# Patient Record
Sex: MALE | Race: WHITE
[De-identification: names, ages, dates, MRNs, and addresses within clinical notes are randomized per-mention and may not be internally consistent; named-entity substitution may affect disease eponyms.]

---

## 2017-08-08 ENCOUNTER — HOSPITAL ENCOUNTER (EMERGENCY)
Dept: HOSPITAL 47 - EC | Age: 62
Discharge: HOME | End: 2017-08-08
Payer: COMMERCIAL

## 2017-08-08 VITALS — SYSTOLIC BLOOD PRESSURE: 145 MMHG | DIASTOLIC BLOOD PRESSURE: 73 MMHG | RESPIRATION RATE: 18 BRPM | HEART RATE: 67 BPM

## 2017-08-08 VITALS — TEMPERATURE: 97.3 F
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
## 2017-08-08 DIAGNOSIS — R40.2412: ICD-10-CM

## 2017-08-08 DIAGNOSIS — Z87.891: ICD-10-CM
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
## 2017-08-08 DIAGNOSIS — S43.141A: Primary | ICD-10-CM

## 2017-08-08 PROCEDURE — 99283 EMERGENCY DEPT VISIT LOW MDM: CPT

## 2017-08-08 NOTE — XR
EXAMINATION TYPE: XR shoulder complete RT

 

DATE OF EXAM: 8/8/2017

 

CLINICAL HISTORY: Right shoulder pain after fall injury today.

 

TECHNIQUE:  Three views of the right shoulder are obtained.

 

COMPARISON: None. 

 

FINDINGS:  There is no acute fracture evident in the right shoulder. Ruptures are demineralized. Ther
e is joint space loss and spurring at glenohumeral joint. . Acromioclavicular joint separation with i
nferior margin of distal clavicle  superiorly up to 1.8 cm from inferior margin of acromion 
is present. The visualized ribs are intact and unremarkable.

 

IMPRESSION:  There is acromioclavicular joint separation injury as detailed above.
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
## 2019-06-10 ENCOUNTER — HOSPITAL ENCOUNTER (EMERGENCY)
Dept: HOSPITAL 47 - EC | Age: 64
Discharge: HOME | End: 2019-06-10
Payer: COMMERCIAL

## 2019-06-10 VITALS — HEART RATE: 7 BPM

## 2019-06-10 VITALS — RESPIRATION RATE: 20 BRPM | DIASTOLIC BLOOD PRESSURE: 77 MMHG | SYSTOLIC BLOOD PRESSURE: 121 MMHG | TEMPERATURE: 98.8 F

## 2019-06-10 DIAGNOSIS — Z87.891: ICD-10-CM

## 2019-06-10 DIAGNOSIS — J44.9: Primary | ICD-10-CM

## 2019-06-10 DIAGNOSIS — Z98.890: ICD-10-CM

## 2019-06-10 PROCEDURE — 71046 X-RAY EXAM CHEST 2 VIEWS: CPT

## 2019-06-10 PROCEDURE — 99285 EMERGENCY DEPT VISIT HI MDM: CPT

## 2019-06-10 PROCEDURE — 94640 AIRWAY INHALATION TREATMENT: CPT

## 2019-06-10 NOTE — XR
EXAMINATION TYPE: XR chest 2V

 

DATE OF EXAM: 6/10/2019

 

COMPARISON: NONE

 

TECHNIQUE: PA and lateral views submitted.

 

HISTORY: Pain

 

FINDINGS:

The lungs are clear and  there is no pneumothorax, pleural effusion, or focal pneumonia.  Heart is pr
ominent. Hypertrophic and degenerative changes spine. No overt failure. Right-sided pleural thickenin
g noted.

 

IMPRESSION: 

1. No acute process.

## 2019-06-10 NOTE — ED
SOB HPI





- General


Chief Complaint: Shortness of Breath


Stated Complaint: SOB


Time Seen by Provider: 06/10/19 15:03


Source: patient, RN notes reviewed


Mode of arrival: ambulatory


Limitations: no limitations





- History of Present Illness


Initial Comments: 





62-year-old male presents emergency Department with chief complaint of shortness

of breath.  Patient has underlying COPD and which she only uses albuterol 

inhaler.  Patient states that with his seasonal ALLERGIES been having increased 

shortness of breath.  Patient does get relief with his inhaler.  Patient is on 

no maintenance medications.  Patient denies fever, chills, productive cough.  

Patient denies any chest pain, headache or dizziness.





- Related Data


                                  Previous Rx's











 Medication  Instructions  Recorded


 


Ibuprofen [Motrin] 600 mg PO Q6HR PRN #20 tab 08/08/17


 


predniSONE 50 mg PO DAILY #5 tab 06/10/19











                                    Allergies











Allergy/AdvReac Type Severity Reaction Status Date / Time


 


No Known Allergies Allergy   Verified 08/08/17 12:13














Review of Systems


ROS Statement: 


Those systems with pertinent positive or pertinent negative responses have been 

documented in the HPI.





ROS Other: All systems not noted in ROS Statement are negative.





Past Medical History


Past Medical History: COPD, Hyperlipidemia, Hypertension


History of Any Multi-Drug Resistant Organisms: None Reported


Past Surgical History: Back Surgery, Orthopedic Surgery


Past Psychological History: No Psychological Hx Reported


Smoking Status: Former smoker


Past Alcohol Use History: None Reported


Past Drug Use History: None Reported





General Exam


Limitations: no limitations


General appearance: alert, in no apparent distress


Head exam: Present: atraumatic, normocephalic, normal inspection


Eye exam: Present: normal appearance, PERRL, EOMI.  Absent: scleral icterus, 

conjunctival injection, periorbital swelling


ENT exam: Present: normal exam, mucous membranes moist


Neck exam: Present: normal inspection.  Absent: tenderness, meningismus, 

lymphadenopathy


Respiratory exam: Present: wheezes (Minimal).  Absent: normal lung sounds 

bilaterally, respiratory distress, rales, rhonchi, stridor


Cardiovascular Exam: Present: regular rate, normal rhythm, normal heart sounds. 

 Absent: systolic murmur, diastolic murmur, rubs, gallop, clicks





Course





                                   Vital Signs











  06/10/19 06/10/19





  14:20 15:37


 


Temperature 98.8 F 


 


Pulse Rate 67 69


 


Respiratory 20 





Rate  


 


Blood Pressure 121/77 


 


O2 Sat by Pulse 93 L 





Oximetry  














Medical Decision Making





- Medical Decision Making





63-year-old male presenting emergency department because he uses his inhaler 

more than usual.  He has no current symptoms as the symptoms have resolved after

 use of his inhaler.  He did have some mild wheezing on exam was given 

treatment.  Patient was started on steroids for a mild COPD exacerbation related

 to seasonal ALLERGIES.  We discussed he states that follow-up with PCP or 

pulmonology to discuss preventative medications to slow down his use of abortive

 medications





Disposition


Clinical Impression: 


 COPD (chronic obstructive pulmonary disease)





Disposition: HOME SELF-CARE


Condition: Stable


Instructions (If sedation given, give patient instructions):  COPD (Chronic 

Obstructive Pulmonary Disease) (ED)


Additional Instructions: 


Please return to the Emergency Department if symptoms worsen or any other 

concerns.


Prescriptions: 


predniSONE 50 mg PO DAILY #5 tab


Is patient prescribed a controlled substance at d/c from ED?: No


Referrals: 


Adam Song MD [Primary Care Provider] - 1-2 days


Time of Disposition: 16:08

## 2020-12-22 ENCOUNTER — HOSPITAL ENCOUNTER (OUTPATIENT)
Dept: HOSPITAL 47 - SLEEP | Age: 65
Discharge: HOME | End: 2020-12-22
Attending: INTERNAL MEDICINE
Payer: MEDICARE

## 2020-12-22 DIAGNOSIS — J96.11: ICD-10-CM

## 2020-12-22 DIAGNOSIS — Z79.899: ICD-10-CM

## 2020-12-22 DIAGNOSIS — G47.00: Primary | ICD-10-CM

## 2020-12-22 DIAGNOSIS — E66.9: ICD-10-CM

## 2020-12-22 DIAGNOSIS — G89.29: ICD-10-CM

## 2020-12-22 DIAGNOSIS — F17.200: ICD-10-CM

## 2020-12-22 DIAGNOSIS — Z79.891: ICD-10-CM

## 2020-12-22 DIAGNOSIS — J44.9: ICD-10-CM

## 2020-12-22 DIAGNOSIS — M19.90: ICD-10-CM

## 2020-12-22 DIAGNOSIS — M54.9: ICD-10-CM

## 2020-12-22 DIAGNOSIS — E78.5: ICD-10-CM

## 2020-12-22 PROCEDURE — 99211 OFF/OP EST MAY X REQ PHY/QHP: CPT

## 2020-12-22 NOTE — CONS
CONSULTATION



REASON FOR CONSULTATION:

Sleep apnea.



This is a 65-year-old male patient with known history of COPD who follows up with Dr. John, maintained on Spiriva, smokes about one pack of cigarettes a day.  Primary care

physician was Dr. Song and currently Dr. Gaffney. The patient is having difficulties

in sleep maintenance.  He can easily go to sleep in his bedroom in his bed, and within

a few hours of falling asleep he will wake up choking and gasping and will move himself

to a recliner.  On the recliner his sleep quality is better and he can sleep another 5

hours on the recliner without any major difficulties.  In the morning he may take a nap

or two if needed.  He tries to sleep on his side.  On his back, he chokes and gasps and

has apneas.  He is  and he is currently living alone.  He is also retired.  He

has gained weight over the years. His weight gain is on the order of 30 pounds over the

past 5 years.  He has chronic exertional dyspnea.  He has resting dyspnea and wheezing,

and his room-air pulse ox is around 91%.  He can drive a car.  No history of any motor

vehicle accident because of feeling drowsy or sleepy.  No grinding of the teeth.  No

sleepwalking or sleeptalking.  No angina, palpitations or heartburn overnight.  No

significant shortness of breath overnight.  He is averaging around 5 to 7 hours of

sleep.



PAST MEDICAL HISTORY:

Obesity, hypothyroidism, COPD, hyperlipidemia, chronic back pain, osteoarthritis.



SURGICAL HISTORY:

Surgical history includes knee surgery and back surgery.



DRUG ALLERGIES:

NOT KNOWN.



OUTPATIENT MEDICATION LIST:

Outpatient medication list includes:

1. Lovastatin 5 mg p.o. daily.

2. Atenolol 50 mg p.o. daily.

3. Spiriva 1 inhalation a day.

4. Albuterol HFA on a p.r.n. basis.

5. Naprosyn on a p.r.n. basis.

6. Tylenol on a p.r.n. basis.

7. Melatonin on a p.r.n. basis.



SOCIAL HISTORY:

He smokes one pack of cigarettes a day.  No history of alcoholism.  No history of IV

drugs.  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



FAMILY HISTORY:

His brother has obstructive sleep apnea.



REVIEW OF SYSTEMS:

Fourteen-point review of system was done. Positive findings were all mentioned above in

the history of present illness.



PHYSICAL EXAMINATION:

VITAL SIGNS: BP is 158/72, pulse 67, respirations 20, temperature 96.7, saturation 91%

on room air.  Height is 5 feet 9 inches, weight is 272.  [place] score is 3.  Neck size

19 inches.  BMI is 40.

GENERAL APPEARANCE:  Calm, comfortable.

HEAD: Atraumatic, normocephalic.

NECK:  Supple.  No JVD.  No goiter or neck masses. Mallampati class IV along with an

overbite.

LUNGS:  Clear to auscultation.  Diminished. Scattered expiratory wheezes throughout the

lung fields bilaterally.

HEART:  Heart sounds are regular rate and rhythm.  Normal S1, S2.  No S3, S4.  No

murmurs.

ABDOMEN:  Soft, nontender.  No organomegaly.

EXTREMITIES:  No edema.  No cyanosis or clubbing.



IMPRESSION:

1. Sleep maintenance insomnia.  The patient is unable to sleep in his bedroom in his

    bed and he is moving up to a recliner.  No major hypersomnia or sleepiness.

    Nevertheless, his sleep is quite fragmented.  He snores and he has episodes of

    apnea, especially when he is lying down flat in his bed.  He has Mallampati class

    IV with an overbite.  Obviously he has features of obstructive sleep apnea that

    needs to be further investigated.

2. Chronic obstructive pulmonary disease.

3. Chronic hypoxemic respiratory failure.  Pulse ox on room air is 91%.

4. Smoker.

5. Obesity with a BMI of 40.1.

6. Chronic back pain.

7. Osteoarthritis.

8. Hyperlipidemia.



PLAN:

1. High suspicion for obstructive sleep apnea.

2. Ideally would like to do an in-lab PSG both in bed and on a recliner.  He declined

    that.  Will proceed with a home sleep study to assess the presence of sleep apnea

    and its severity and will offer patient treatment accordingly.  I do not think he

    is very much interested in CPAP therapy at this point; however, this may be an

    option, especially if he turns out to have severe disease.  We will continue to

    follow.



MMODL / IJN: [id number] / Job#: [id number]

## 2021-02-24 ENCOUNTER — HOSPITAL ENCOUNTER (OUTPATIENT)
Dept: HOSPITAL 47 - RADCTMAIN | Age: 66
Discharge: HOME | End: 2021-02-24
Attending: INTERNAL MEDICINE
Payer: MEDICARE

## 2021-02-24 DIAGNOSIS — F17.210: ICD-10-CM

## 2021-02-24 DIAGNOSIS — R91.1: ICD-10-CM

## 2021-02-24 DIAGNOSIS — Z12.2: Primary | ICD-10-CM

## 2021-02-24 PROCEDURE — 71271 CT THORAX LUNG CANCER SCR C-: CPT

## 2021-02-24 NOTE — CTL
EXAMINATION TYPE:  CT Low Dose Lung

 

DATE OF EXAM ORDERED: 2/24/2021

 

HISTORY: . Lung cancer screening

 

CT DLP: 110 mGycm

CT CTDI: 3.1 mGy

Automated exposure control for dose reduction was used.

 

SCREENING VISIT:

 

COMPARISON: None

 

TECHNIQUE: Low dose computed tomography scan was performed through the chest at 1 mm thick sections a
nd reconstructed images in the coronal plane at 1 mm thick sections.

 

CT DIAGNOSTIC QUALITY: Satisfactory

 

FINDINGS:

Exam limited by artifact. Axial image 87 there is a 2 mm nodule in the right upper lobe. Subsegmental
 changes at the left lung base most typical of atelectasis. No pleural effusion or pneumothorax. Hear
t size is within normal limits. Mild coronary artery calcification and atherosclerotic change aorta.

 

No pleural calcification or pneumothorax. No consolidative pneumonia. Hypertrophic changes of the spi
ne. Degenerative changes of the spine.

 

 IMPRESSION: 

1. Limited exam due to artifact demonstrates a single 2 mm nodule in the right upper lobe likely eliezer
gn.

 

CT LUNG RAD AND CT CHEST RECOMMENDATION: Lung-Rad 2 Benign Appearance or Behavior: Continue annual sc
reening with LDCT in 12 months.

 

S Modifier (other clinically significant findings): None

## 2021-04-26 ENCOUNTER — HOSPITAL ENCOUNTER (OUTPATIENT)
Dept: HOSPITAL 47 - RADNMMAIN | Age: 66
Discharge: HOME | End: 2021-04-26
Attending: UROLOGY
Payer: MEDICARE

## 2021-04-26 DIAGNOSIS — C61: ICD-10-CM

## 2021-04-26 DIAGNOSIS — M25.851: Primary | ICD-10-CM

## 2021-04-26 LAB — BUN SERPL-SCNC: 12 MG/DL (ref 9–20)

## 2021-04-26 PROCEDURE — 36415 COLL VENOUS BLD VENIPUNCTURE: CPT

## 2021-04-26 PROCEDURE — 71046 X-RAY EXAM CHEST 2 VIEWS: CPT

## 2021-04-26 PROCEDURE — 82565 ASSAY OF CREATININE: CPT

## 2021-04-26 PROCEDURE — 74177 CT ABD & PELVIS W/CONTRAST: CPT

## 2021-04-26 PROCEDURE — 84520 ASSAY OF UREA NITROGEN: CPT

## 2021-04-26 PROCEDURE — 78306 BONE IMAGING WHOLE BODY: CPT

## 2021-04-26 NOTE — XR
EXAMINATION TYPE: XR chest 2V

 

DATE OF EXAM: 4/26/2021

 

COMPARISON: Chest x-ray 6/10/2019

 

HISTORY: Prostate cancer

 

TECHNIQUE:  Frontal and lateral views of the chest are obtained.

 

FINDINGS:  There is no focal air space opacity, pleural effusion, or pneumothorax seen.  The cardiac 
silhouette size is within normal limits.   The osseous structures are intact, there is arthropathy in
 the left shoulder.

 

IMPRESSION:  No acute cardiopulmonary process.

## 2021-04-26 NOTE — NM
EXAMINATION TYPE: NM bone scan whole body

 

DATE OF EXAM: 4/26/2021

 

COMPARISON: CT chest 2/24/2021, CT abdomen pelvis 4/26/2021

 

HISTORY: Prostate carcinoma

 

Delayed whole-body scanning was performed following the injection of 23.4 mCi Tc 99m MDP.  Images acq
uired 3 hours post injection.

 

FINDINGS: 

 

The right seventh rib shows uptake which is consistent with patient's fracture, nonunion. Soft tissue
 uptake is normal. Uptake within the shoulders, spine, knees, feet, sternoclavicular joints is likely
 degenerative. No areas of abnormal increased or decreased uptake to suggest metastatic disease. 
shun focus on CT scan at the acetabular roof does not show increased uptake on bone scan.

 

IMPRESSION: 

 

No evident metastatic disease.

## 2021-04-27 ENCOUNTER — HOSPITAL ENCOUNTER (OUTPATIENT)
Dept: HOSPITAL 47 - SLEEP | Age: 66
End: 2021-04-27
Attending: INTERNAL MEDICINE
Payer: MEDICARE

## 2021-04-27 DIAGNOSIS — E66.9: ICD-10-CM

## 2021-04-27 DIAGNOSIS — E78.5: ICD-10-CM

## 2021-04-27 DIAGNOSIS — G47.33: Primary | ICD-10-CM

## 2021-04-27 DIAGNOSIS — E03.9: ICD-10-CM

## 2021-04-27 NOTE — PN
PROGRESS NOTE



This patient was diagnosed having obstructive sleep apnea.  The patient is coming here

for a compliancy check.  The patient was diagnosed having moderate to severe disease

with an AHI of 18 and the patient is currently on CPAP pressure of 10 cm of water.  The

patient is also being supplemented with oxygen to maintain saturation above 90%, as the

patient has demonstrated severe nocturnal oxygen desaturation.  The patient spent

approximately 66% of sleep time at a pulse ox of 85% and below and this was thought to

be related to an overlap between COPD and obstructive sleep apnea.  The patient is

doing well.  He is utilizing his machine every night.  He is using AirFit P10 nasal

pillow, large size.  Based on a 30-day compliancy check, the patient utilized his

machine every night and he has achieved more than 4 hours 80% of the time.  He is

averaging around 5.7 hours of CPAP use per night with a leak of 46 L/minutes.  His AHI

is down to 1.3.  More refreshed and alert during the day.  His sleep quality is

improved.  Nevertheless, he is hoping to get a different mask because of his ongoing

leaks through his nasal pillow.  No other significant issues otherwise.  No reported

chest pain, shortness of breath, heartburn.  No history of any motor vehicle accident

because of feeling drowsy or sleepy.  Comorbid conditions have been all stable.



PHYSICAL EXAMINATION:

VITAL SIGNS: BP is 135/82, pulse 68, respirations 16, temperature is 98.7,  saturation

97% on room air.  Weight is 258.  [place] score is at 5.

GENERAL APPEARANCE:  Obese, calm, comfortable.

HEAD: Atraumatic, normocephalic.

NECK:  Supple. There is no JVD.  No goiter or neck mass. Mallampati class 4.

LUNGS:  Clear to auscultation.

HEART:  Heart sounds are regular rate and rhythm. Normal S1, S2. No S3, no S4. No

murmurs.

ABDOMEN:  Soft, nontender. No organomegaly.

EXTREMITIES:  No edema. No cyanosis or clubbing.



IMPRESSION:

1. Obstructive sleep apnea, AHI of 18, currently on CPAP at a pressure of 10 cm of

    water.

2. Severe nocturnal oxygen desaturation and the patient is currently on CPAP pressure

    of 10 in conjunction with O2 at 2 L to maintain saturation above 90%.  Treatment

    has been successful for now.

3. Obesity.

4. Hypothyroidism.

5. Hyperlipidemia.

6. Chronic back pain/degenerative arthritis.

7. Chronic hypersomnia improved and the patient's [place] score is down to 5.



PLAN:

1. Clinically improved.

2. Sleep quality is improved.

3. Keep CPAP pressure at 10.

4. Drop the AirFit P10 mask and utilize a P30I nasal pillow as an alternative to

    minimize the leaks.

5. See me back in 6 months' time in followup.

Meanwhile, the patient is having some high pressure sensation on and off in the middle

of the night.  I suggested switching him to an APAP mode at a pressure minimum of 4

maximum of 10 and keeping the starting pressure at 4 with an automatic ramp time.  This

should hopefully make the patient much more comfortable and along with the change in

the mask, he should see much more improvement and further improvement in sleep quality.

We will continue to follow.





KATHIE / SHEILA: [id number] / Job#: [id number]

## 2021-08-10 NOTE — P.GSHP
History of Present Illness


H&P Date: 08/10/21





67 yo male with G8, t2a n0m0 cap, psa 6.7, 30ml,  undergoing ebrt/lhrh therapy 

comes for a Spaceoar injection prior to beginning the prostate radiation





- Constitutional


Constitutional: Denies chills, Denies fever





- EENT


Eyes: denies blurred vision, denies pain


Ears, nose, mouth and throat: Denies headache, Denies sore throat





- Cardiovascular


Cardiovascular: Denies chest pain, Denies shortness of breath





- Respiratory


Respiratory: Denies cough, Denies 7





- Gastrointestinal


Gastrointestinal: Denies abdominal pain, Denies diarrhea, Denies nausea, Denies 

vomiting





- Genitourinary (Female)


Genitourinary: Denies dysuria, Denies hematuria





- Genitourinary (Male)


Genitourinary: Denies dysuria, Denies hematuria





- Musculoskeletal


Musculoskeletal: Denies myalgias





- Integumentary


Integumentary: Denies pruritus, Denies rash





- Neurological


Neurological: Denies numbness, Denies weakness





- Psychiatric


Psychiatric: Denies anxiety, Denies depression





- Endocrine


Endocrine: Denies fatigue, Denies weight change





Past Medical History


Past Medical History: COPD, Hyperlipidemia, Hypertension


History of Any Multi-Drug Resistant Organisms: None Reported


Past Surgical History: Back Surgery, Orthopedic Surgery


Past Psychological History: No Psychological Hx Reported


Past Alcohol Use History: None Reported


Past Drug Use History: None Reported





Medications and Allergies


                                Home Medications











 Medication  Instructions  Recorded  Confirmed  Type


 


Ibuprofen [Motrin] 600 mg PO Q6HR PRN #20 tab 08/08/17  Rx


 


predniSONE 50 mg PO DAILY #5 tab 06/10/19  Rx








                                    Allergies











Allergy/AdvReac Type Severity Reaction Status Date / Time


 


No Known Allergies Allergy   Verified 08/08/17 12:13














Surgical - Exam





- General


well developed, well nourished, no distress





- Eyes


PERRL





- ENT


no hearing loss





- Neck


trachea midline





- Respiratory


normal expansion, normal respiratory effort





- Abdomen


Abdomen: soft, non tender





- Genitourinary


testicles present





- Integumentary


no rash, no growths





- Neurologic


normal coordination, normal sensation





- Musculoskeletal


normal gait, normal posture





- Psychiatric


oriented to time, oriented to person, oriented to place, speech is normal, 

memory intact





Assessment and Plan


Assessment: 





Impression:  Caprostate


Plan: spaceoar periprostatic injection

## 2021-08-12 ENCOUNTER — HOSPITAL ENCOUNTER (OUTPATIENT)
Dept: HOSPITAL 47 - OR | Age: 66
Discharge: HOME | End: 2021-08-12
Attending: UROLOGY
Payer: MEDICARE

## 2021-08-12 VITALS — TEMPERATURE: 97.7 F | RESPIRATION RATE: 16 BRPM

## 2021-08-12 VITALS — DIASTOLIC BLOOD PRESSURE: 58 MMHG | SYSTOLIC BLOOD PRESSURE: 104 MMHG | HEART RATE: 55 BPM

## 2021-08-12 DIAGNOSIS — I10: ICD-10-CM

## 2021-08-12 DIAGNOSIS — F17.200: ICD-10-CM

## 2021-08-12 DIAGNOSIS — E78.5: ICD-10-CM

## 2021-08-12 DIAGNOSIS — J44.9: ICD-10-CM

## 2021-08-12 DIAGNOSIS — Z85.46: ICD-10-CM

## 2021-08-12 DIAGNOSIS — C61: Primary | ICD-10-CM

## 2021-08-12 PROCEDURE — 55876 PLACE RT DEVICE/MARKER PROS: CPT

## 2021-08-12 NOTE — P.OP
Date of Procedure: 08/12/21


Preoperative Diagnosis: 


Adenocarcinoma of the Prostate


Postoperative Diagnosis: 


Same


Procedure(s) Performed: 


SpaceOAR Implant


Anesthesia: MAC


Surgeon: Mat Orozco


Estimated Blood Loss (ml): 10


IV fluids (ml): 350


Pathology: none sent


Condition: stable


Disposition: PACU


Indications for Procedure: 


67 yo male with Bea 8, [id number] prostate cancer.  His prostate volume was 30

mL, and his PSA level was 6.7.  He has elected to be treated with I am RT and 

androgen deprivation therapy.  He now comes for a SpaceOAR implant prior to 

beginning radiation therapy.


Operative Findings: 


11 mm separation created between prostate and rectum.


Description of Procedure: 


The patient was taken to the operating room and placed in the dorsolithotomy 

position, with his legs supported in Nixon stirrups.  The external genitalia was

prepped and draped sterilely.  The Bruel and Kjaer transrectal ultrasound probe 

was placed intrarectally.  The prostate was imaged.  The probe was then placed 

within the stabilizing stand.  A spinal needle was advanced under ultrasonic 

guidance to the level of the urogenital diaphragm, and lidocaine was used to 

infiltrate the tissues as the needle was withdrawn.  Next, the SpaceOAR needle 

was passed through the midline of the perineum, 1-2 cm anterior to the anal 

opening.  The needle was slowly advanced under ultrasonic guidance until the 

needle tip was located within the fat plane between the prostate and rectum, at 

the level of the mid prostate gland.  The needle was confirmed to be midline on 

the axial imaging.  A small amount of normal saline was injected for hyd

rodissection.  Next, the SpaceOAR components were mixed and loaded into the Y 

connector per protocol.  The Y connector was then connected to the needle, and 

the components were injected slowly over a course of approximately 12 seconds.  

A total of 10 ml was injected.  11 mm distance was created between the prostate 

and rectum, as desired.  It should be noted that at no point was there any 

concern of rectal perforation.  The needle was withdrawn, as well as the 

transrectal ultrasound probe, and the procedure was terminated.  The patient 

tolerated the procedure well and was taken to the recovery room in stable 

condition.

## 2021-11-16 ENCOUNTER — HOSPITAL ENCOUNTER (OUTPATIENT)
Dept: HOSPITAL 47 - SLEEP | Age: 66
End: 2021-11-16
Attending: INTERNAL MEDICINE
Payer: MEDICARE

## 2021-11-16 DIAGNOSIS — Z92.3: ICD-10-CM

## 2021-11-16 DIAGNOSIS — J44.9: ICD-10-CM

## 2021-11-16 DIAGNOSIS — Z87.891: ICD-10-CM

## 2021-11-16 DIAGNOSIS — Z79.899: ICD-10-CM

## 2021-11-16 DIAGNOSIS — Z85.46: ICD-10-CM

## 2021-11-16 DIAGNOSIS — M19.90: ICD-10-CM

## 2021-11-16 DIAGNOSIS — G47.33: Primary | ICD-10-CM

## 2021-11-16 DIAGNOSIS — Z99.89: ICD-10-CM

## 2021-11-16 DIAGNOSIS — E78.5: ICD-10-CM

## 2021-11-16 NOTE — PN
PROGRESS NOTE



66-year-old male patient coming in for an annual check regarding obstructive sleep

apnea.  The patient is a 66-year-old male patient known having obstructive sleep apnea

in addition to hypothyroidism and hyperlipidemia and degenerative arthritis.  The

patient states that he is a short sleeper and he is sleeping only 4 hours.  This is

reflected on the compliance data that was noted on his machine.  Note that the patient

has moderate severe REMEDIOS with an AHI of 18 and the patient is currently on an APAP at a

pressure minimum of 4, maximum of 10.  Based on a 30-day compliancy, the patient has

used the machine every day.  He has achieved more than 4 hours only 4 out of 30 days.

He is averaging around 2.9 hours of CPAP use per night.  Note that he goes to bed

around 10 p.m., wakes up 2:00 am in the morning and he moves to a recliner to watch TV

and he sleeps on and off without a CPAP until he gets up at around 5 o'clock in the

morning.  As such, he is not sleeping in bed all the time.  This is probably related to

his back pain.  His leak from the machine in the order of 20 L/minute and AHI is down

to 1.3 while on treatment.  He is using AirFit P10 large-sized nasal pillows.  His

weight is stable.  He has lost around 6 pounds since his last evaluation.  He has also

developed prostate cancer.  The patient was treated with radiation therapy.  He is

having some episodic nocturia.  No nighttime shortness of breath or chest pain or

shortness of breath.  No recent cardiovascular complications.



REVIEW OF SYSTEMS:

Fourteen-point review of system was done.  Positive findings are mentioned in history

of present illness.  He has nocturia and he has also prostate cancer treated by

radiation therapy through [facility] radiation department.  His COPD is stable on Spiriva.

His blood pressure is also stable.



PHYSICAL EXAMINATION:

BP is 128/62, pulse 64, respirations 20, temperature 97.7. Saturation is 92% on room

air.  Height is 5 feet 8 inches, weight is 252 and BMI 37.7.  Williamsburg score is 7.

General appearance:  Calm, comfortable.  Head is atraumatic, normocephalic.

Neck:  Supple.  No JVD.  No goiter.  No  neck mass. Mallampati class 4. Lungs:  Clear

to auscultation.  Heart:  Heart sounds are regular rate and rhythm. Normal S1, S2.  No

murmurs.  Abdomen:  Soft, nontender. No organomegaly.  Extremities:  No edema, no

cyanosis or clubbing.



IMPRESSION:

1. Symptomatic REMEDIOS, AHI of 18, currently on APAP, pressure minimum of 4, maximum of

    10.  While on treatment, the patient's treatment is successful.  Nevertheless, he

    seems to be a short sleeper and sleeping around 4 hours at night and he moved to a

    recliner and he watches television for the rest of the day.  He is not having any

    major tiredness or sleepiness.  Williamsburg score is at 7 and he is not taking any naps

    during the day.

2. History of prostate cancer with recent radiation therapy.

3. Chronic obstructive pulmonary diseased, currently inactive and stable on Spiriva.

4. Hyperlipidemia.

5. Degenerative arthritis.



PLAN:

I would recommend extending the number of hours of sleep in bed.  Compliance data would

look much better if the patient is able to sleep longer hours in bed.  Another option

is to move the CPAP machine with him to the living room where he can sleep on a

recliner.  He will be encouraged to lose weight.  He has lost around 6 pounds.  His

supplies are all up to date.  The patient is using AirFit P10 nasal pillows.  Continue

using the same nasal interface for now.  We will continue to follow.





MMODL / IJN: [id number] / Job#: [id number]

## 2022-06-01 ENCOUNTER — HOSPITAL ENCOUNTER (OUTPATIENT)
Dept: HOSPITAL 47 - RADCTMAIN | Age: 67
Discharge: HOME | End: 2022-06-01
Attending: INTERNAL MEDICINE
Payer: MEDICARE

## 2022-06-01 DIAGNOSIS — Z12.2: Primary | ICD-10-CM

## 2022-06-01 PROCEDURE — 71271 CT THORAX LUNG CANCER SCR C-: CPT

## 2022-06-01 NOTE — CTL
EXAMINATION TYPE:  CT Low Dose Lung

 

DATE OF EXAM ORDERED: 6/1/2022

 

HISTORY: . Lung cancer screening

 

CT DLP: 157.9 mGycm

CT CTDI: 4.1 mGy

Automated exposure control for dose reduction was used.

 

SCREENING VISIT: Subsequent

 

COMPARISON: 2/24/2021

 

TECHNIQUE: Low dose computed tomography scan was performed through the chest at 1 mm thick sections a
nd reconstructed images in the coronal plane at 1 mm thick sections.

 

CT DIAGNOSTIC QUALITY: Satisfactory

 

FINDINGS:

LUNG NODULES: None.  Previous nodule could not be reidentified.

 

LUNGS:

COPD: Severity: Mild correlate for chronic bronchitis.

Fibrosis: Severity: None 

Lymph nodes: None

Other findings: None

 

RIGHT PLEURAL SPACE:

Effusion: None

Calcification: None

Thickening: None

Pneumothorax: None

 

LEFT PLEURAL SPACE:

Effusion: None

Calcification: None

Thickening: None

Pneumothorax:

 

HEART:  

Heart Size: Normal

Coronary calcification: Mild

Pericardial effusion: None

 

OTHER FINDINGS:

Upper abdomen: Normal

Bony thorax: Normal

Supraclavicular region: Normal

Other: Ascending thoracic aorta at the level of main pulmonary artery is 4.0 cm. The pulmonary bifurc
ation is 3.1 cm.

 

IMPRESSION: Negative low-dose CT chest for neoplasm

 

FOLLOW UP CT CHEST RECOMMENDATION: 1 year

CT LUNG RAD: 1

## 2022-06-23 ENCOUNTER — HOSPITAL ENCOUNTER (OUTPATIENT)
Dept: HOSPITAL 47 - ORWHC2ENDO | Age: 67
Discharge: HOME | End: 2022-06-23
Attending: SURGERY
Payer: MEDICARE

## 2022-06-23 VITALS — HEART RATE: 56 BPM | DIASTOLIC BLOOD PRESSURE: 78 MMHG | SYSTOLIC BLOOD PRESSURE: 142 MMHG

## 2022-06-23 VITALS — RESPIRATION RATE: 18 BRPM | TEMPERATURE: 97.2 F

## 2022-06-23 VITALS — BODY MASS INDEX: 34.7 KG/M2

## 2022-06-23 DIAGNOSIS — C61: ICD-10-CM

## 2022-06-23 DIAGNOSIS — G47.33: ICD-10-CM

## 2022-06-23 DIAGNOSIS — J44.9: ICD-10-CM

## 2022-06-23 DIAGNOSIS — Z80.42: ICD-10-CM

## 2022-06-23 DIAGNOSIS — Z12.11: Primary | ICD-10-CM

## 2022-06-23 DIAGNOSIS — Z79.899: ICD-10-CM

## 2022-06-23 DIAGNOSIS — I10: ICD-10-CM

## 2022-06-23 DIAGNOSIS — Z87.891: ICD-10-CM

## 2022-06-23 DIAGNOSIS — D12.3: ICD-10-CM

## 2022-06-23 DIAGNOSIS — E78.5: ICD-10-CM

## 2022-06-23 DIAGNOSIS — M19.90: ICD-10-CM

## 2022-06-23 DIAGNOSIS — K64.8: ICD-10-CM

## 2022-06-23 DIAGNOSIS — K64.4: ICD-10-CM

## 2022-06-23 PROCEDURE — 45385 COLONOSCOPY W/LESION REMOVAL: CPT

## 2022-06-23 PROCEDURE — 88305 TISSUE EXAM BY PATHOLOGIST: CPT

## 2022-06-23 RX ADMIN — POTASSIUM CHLORIDE SCH MLS: 14.9 INJECTION, SOLUTION INTRAVENOUS at 08:06

## 2022-06-23 RX ADMIN — POTASSIUM CHLORIDE SCH MLS: 14.9 INJECTION, SOLUTION INTRAVENOUS at 08:33

## 2022-06-23 NOTE — P.GSHP
History of Present Illness


H&P Date: 06/23/22


Chief Complaint: Screening colonoscopy





This a 66-year-old male who presents today for screening colonoscopy.  Patient 

denies any significant GI complaints.





Past Medical History


Past Medical History: Cancer, COPD, Hyperlipidemia, Hypertension, Osteoarthritis

(OA), Sleep Apnea/CPAP/BIPAP


Additional Past Medical History / Comment(s): USES CPAP.  PROSTATE CA DIAGNOSED 

IN 4/8/21.  TB AS A CHILD.


History of Any Multi-Drug Resistant Organisms: None Reported


Past Surgical History: Back Surgery, Orthopedic Surgery, Tonsillectomy


Additional Past Surgical History / Comment(s): LEFT KNEE OPEN, COLONOSCOPY,


Past Anesthesia/Blood Transfusion Reactions: No Reported Reaction


Smoking Status: Current every day smoker





- Past Family History


  ** Mother


Family Medical History: Cancer





  ** Father


Family Medical History: Cancer


Additional Family Medical History / Comment(s): PROSTATE





Medications and Allergies


                                Home Medications











 Medication  Instructions  Recorded  Confirmed  Type


 


Albuterol Inhaler [Ventolin Hfa 1 puff INHALATION HS 08/10/21 06/23/22 History





Inhaler]    


 


Naproxen Sodium [Aleve] 220 - 440 mg PO Q6H PRN 08/10/21 06/23/22 History


 


Rosuvastatin Calcium 5 mg PO HS 08/10/21 06/23/22 History


 


Tiotropium 18 Mcg/Puff [Spiriva] 1 puff INHALATION QAM 08/10/21 06/23/22 History


 


atenoloL [Tenormin] 50 mg PO QAM 08/10/21 06/23/22 History








                                    Allergies











Allergy/AdvReac Type Severity Reaction Status Date / Time


 


No Known Allergies Allergy   Verified 06/23/22 08:04














Surgical - Exam


                                   Vital Signs











Temp Pulse Resp BP Pulse Ox


 


 97.2 F L  69   18   148/81   91 L


 


 06/23/22 08:07  06/23/22 08:07  06/23/22 08:07  06/23/22 08:07  06/23/22 08:07














- General


well developed, well nourished, no distress





- Eyes


PERRL





- ENT


normal pinna





- Neck


no masses





- Respiratory


normal expansion





- Cardiovascular


Rhythm: regular





- Abdomen


Abdomen: soft, non tender





Assessment and Plan


Assessment: 





We'll perform screening colonoscopy

## 2022-06-23 NOTE — P.OP
Date of Procedure: 06/23/22


Preoperative Diagnosis: 


Screening colonoscopy


Postoperative Diagnosis: 


Transverse colon polyp


Procedure(s) Performed: 


Colonoscopy


Anesthesia: MAC


Surgeon: Carlitos Cabrera


Pathology: other (Transverse colon polyp)


Condition: stable


Disposition: PACU


Description of Procedure: 


The patient's placed on the endoscopy table in the lateral position.  He 

received IV sedation.  Digital rectal exam performed which revealed a few 

external hemorrhoids.  Colonoscope was then placed patient anus and passed 

throughout the entire colon.  The ileocecal valve was visualized.  The right 

colon appeared normal.  In the transverse colon there was a peduncular polyp 

removed with snare.  Scope withdrawn and the remainder the transverse colon 

appeared normal.  In the descending and sigmoid colon was mild diverticular 

changes.  The scope was then brought back the rectum and this appeared normal.  

Scope withdrawn for patient.  Internal and external hemorrhoids are noted.

## 2022-08-26 ENCOUNTER — HOSPITAL ENCOUNTER (OUTPATIENT)
Dept: HOSPITAL 47 - RADECHMAIN | Age: 67
Discharge: HOME | End: 2022-08-26
Attending: INTERNAL MEDICINE
Payer: MEDICARE

## 2022-08-26 DIAGNOSIS — I27.20: ICD-10-CM

## 2022-08-26 DIAGNOSIS — I07.1: Primary | ICD-10-CM

## 2022-08-26 PROCEDURE — 93306 TTE W/DOPPLER COMPLETE: CPT

## 2022-08-26 NOTE — CA
Transthoracic Echo Report 

 Name: Layton Marks 

 MRN:    [id number] 

 Age:    67     Gender:     M 

 

 :    1955 

 Exam Date:     2022 08:14 

 Exam Location: [facility] 

 Ht (in):     71     Wt (lb):     245 

 Ordering Physician:        Irving Gaffney MD 

 Attending/Referring Phys: 

 Technician         Christal Orellana RDCS 

 Procedure CPT: 

 Indications:       palpitations R00.2 

 

 Cardiac Hx: 

 Technical Quality: 

 Contrast 1:                                Total Dose (mL): 

 Contrast 2:                                Total Dose (mL): 

 

 MEASUREMENTS  (Male / Female) Normal Values 

 2D ECHO 

 LV Diastolic Diameter PLAX        5.4 cm                4.2 - 5.9 / 3.9 - 5.3 cm 

 LV Systolic Diameter PLAX         4.0 cm                 

 IVS Diastolic Thickness           1.4 cm                0.6 - 1.0 / 0.6 - 0.9 cm 

 LVPW Diastolic Thickness          1.4 cm                0.6 - 1.0 / 0.6 - 0.9 cm 

 LV Relative Wall Thickness        0.5                    

 RV Internal Dim ED PLAX           3.8 cm                 

 LA Systolic Diameter LX           3.8 cm                3.0 - 4.0 / 2.7 - 3.8 cm 

 LA Volume                         81.3 cm???              18 - 58 / 22 - 52 cm??? 

 

 M-MODE 

 Aortic Root Diameter MM           4.1 cm                 

 MV E Point Septal Separation      1.1 cm                 

 AV Cusp Separation MM             2.4 cm                 

 

 DOPPLER 

 AV Peak Velocity                  152.1 cm/s             

 AV Peak Gradient                  9.3 mmHg               

 MV Area PHT                       2.3 cm???                

 Mitral E Point Velocity           80.0 cm/s              

 Mitral A Point Velocity           84.9 cm/s              

 Mitral E to A Ratio               0.9                    

 MV Deceleration Time              324.7 ms               

 TR Peak Velocity                  314.1 cm/s             

 TR Peak Gradient                  39.5 mmHg              

 Right Ventricular Systolic Press  44.5 mmHg              

 

 

 FINDINGS 

 Left Ventricle 

 Left ventricular ejection fraction is estimated at55-60 %. Left ventricular  

 cavity size normal. Moderate concentric left ventricular hypertrophy. 

 

 Right Ventricle 

 Moderate right ventricular dilatation. Mild pulmonary hypertension. 

 

 Right Atrium 

 Normal right atrial size. 

 

 Left Atrium 

 Severely increased left atrial volume. Mildly increased left atrial area. No  

 evidence for an atrial septal defect. 

 

 Mitral Valve 

 Structurally normal mitral valve. No evidence for mitral valve prolapse. Mild  

 mitral regurgitation. 

 

 Aortic Valve 

 Trileaflet aortic valve. No aortic valve stenosis or regurgitation. 

 

 Tricuspid Valve 

 Mild tricuspid regurgitation.structurally normal tricuspid valve. 

 

 Pulmonic Valve 

 Pulmonic valve not well visualized. 

 

 Pericardium 

 Normal pericardium. No pericardial effusion. 

 

 Aorta 

 Mild aortic dilatation at the level of the sinuses of valsalva 41 mm 

 

 CONCLUSIONS 

 1.  Normal left ventricular size and systolic function 

 2.  Mild mitral and tricuspid regurgitation with mild pulmonary hypertension 

 3.  Mildly dilated ascending aorta 

 Previewed by:  

 Dr. Irving Barrow MD 

 (Electronically Signed) 

 Final Date:      2022 18:22

## 2023-06-23 ENCOUNTER — HOSPITAL ENCOUNTER (OUTPATIENT)
Dept: HOSPITAL 47 - RADCTMAIN | Age: 68
Discharge: HOME | End: 2023-06-23
Attending: STUDENT IN AN ORGANIZED HEALTH CARE EDUCATION/TRAINING PROGRAM
Payer: MEDICARE

## 2023-06-23 ENCOUNTER — HOSPITAL ENCOUNTER (OUTPATIENT)
Dept: HOSPITAL 47 - RADUSWWP | Age: 68
Discharge: HOME | End: 2023-06-23
Attending: STUDENT IN AN ORGANIZED HEALTH CARE EDUCATION/TRAINING PROGRAM
Payer: MEDICARE

## 2023-06-23 DIAGNOSIS — Z12.2: Primary | ICD-10-CM

## 2023-06-23 DIAGNOSIS — Z87.891: ICD-10-CM

## 2023-06-23 DIAGNOSIS — I25.10: ICD-10-CM

## 2023-06-23 DIAGNOSIS — R91.1: ICD-10-CM

## 2023-06-23 DIAGNOSIS — Z13.6: Primary | ICD-10-CM

## 2023-06-23 PROCEDURE — 71271 CT THORAX LUNG CANCER SCR C-: CPT

## 2023-06-23 PROCEDURE — 76706 US ABDL AORTA SCREEN AAA: CPT

## 2023-06-23 NOTE — CTL
EXAMINATION TYPE:  CT Low Dose Lung

 

DATE OF EXAM ORDERED: 6/23/2023

 

HISTORY: . Lung cancer screening

 

CT DLP: 95 mGycm

CT CTDI: 2.6 mGy

Automated exposure control for dose reduction was used.

 

SCREENING VISIT:

 

COMPARISON: 6/1/2022

 

TECHNIQUE: Low dose computed tomography scan was performed through the chest at 1 mm thick sections a
nd reconstructed images in multiple planes at 1 mm and 5 mm thick sections.

 

CT DIAGNOSTIC QUALITY: Satisfactory

 

FINDINGS:

There is a 1 mm nodule posterior segment right lower lobe. Axial image 208. There is no consolidative
 pneumonia. No pleural effusion or pneumothorax.

 

The heart is mildly enlarged and there is coronary artery calcification. Aorta normal caliber with mi
ld atherosclerotic changes. There is a small hiatal hernia.

 

There is multilevel hypertrophic and degenerative changes of the spine.

 

IMPRESSION: 

1. There is a 1 mm subpleural nodule right lower lobe which has a benign appearance.

2. Mild coronary artery calcification.

 

CT LUNG RAD AND CT CHEST RECOMMENDATION: Lung-Rad 2 Benign Appearance or Behavior: Continue annual sc
reening with LDCT in 12 months.

## 2023-06-23 NOTE — US
EXAMINATION TYPE: US Aorta Screening

 

DATE OF EXAM: 6/23/2023

 

COMPARISON:

 

CLINICAL INDICATION: Male, 67 years old with history of Z13.6; No hx AAA.  HTN controlled with meds. 
 

 

TECHNIQUE: Multiple sonographic images of the abdominal aorta are obtained.

 

FINDINGS:

 

EXAM MEASUREMENTS:

 

Abdominal Aorta:

** Proximal:  2.2 x 2.6 cm

** Mid:  2.1 x 2.1 cm 

** Distal:  2.0 x 2.3 cm 

** Bifurcation:  Right illiac- 1.1 x 1.2 cm   Left illiac- 0.9 x 1.2 cm 

 

SONOGRAPHER NOTES: No AAA visualized at time of scan

 

 

 

IMPRESSION: 

No AAA

## 2023-07-26 ENCOUNTER — HOSPITAL ENCOUNTER (EMERGENCY)
Dept: HOSPITAL 47 - EC | Age: 68
Discharge: HOME | End: 2023-07-26
Payer: MEDICARE

## 2023-07-26 VITALS — TEMPERATURE: 98.1 F | SYSTOLIC BLOOD PRESSURE: 121 MMHG | HEART RATE: 51 BPM | DIASTOLIC BLOOD PRESSURE: 67 MMHG

## 2023-07-26 VITALS — RESPIRATION RATE: 20 BRPM

## 2023-07-26 DIAGNOSIS — Z79.899: ICD-10-CM

## 2023-07-26 DIAGNOSIS — F17.200: ICD-10-CM

## 2023-07-26 DIAGNOSIS — J44.9: ICD-10-CM

## 2023-07-26 DIAGNOSIS — M25.461: Primary | ICD-10-CM

## 2023-07-26 DIAGNOSIS — E78.5: ICD-10-CM

## 2023-07-26 DIAGNOSIS — I10: ICD-10-CM

## 2023-07-26 PROCEDURE — 99283 EMERGENCY DEPT VISIT LOW MDM: CPT

## 2023-07-26 PROCEDURE — 73562 X-RAY EXAM OF KNEE 3: CPT

## 2023-07-26 NOTE — XR
EXAMINATION TYPE: XR knee complete RT

 

DATE OF EXAM: 7/26/2023

 

COMPARISON: NONE

 

HISTORY: Pain

 

TECHNIQUE:  Frontal, lateral and oblique images of the right knee are obtained.

 

FINDINGS:  There is no acute fracture/dislocation evident. Mild tricompartmental joint space narrowin
g with marginal spurring. No osseous erosions. Moderate size suprapatellar joint effusion. The overly
ing soft tissue appears unremarkable. Vascular sclerosis.

 

IMPRESSION:  

1.  No acute fracture or dislocation seen.

2.  Moderate size suprapatellar joint effusion.

3.  Mild osteoarthritic changes.

## 2023-07-26 NOTE — ED
General Adult HPI





- General


Chief complaint: Extremity Problem,Nontraumatic


Stated complaint: R knee pain


Time Seen by Provider: 07/26/23 12:15


Source: patient, RN notes reviewed, old records reviewed


Mode of arrival: ambulatory


Limitations: no limitations





- History of Present Illness


Initial comments: 





This is a 68-year-old male who presents emergency Department complaining of 

right knee pain for the last 2 weeks.  Patient states there's been some soup 

breath patella swelling he states his been getting a little bit worse.  Patient 

states when he laying flat or not moving it doesn't hurt but when he gets up to 

walk he does feel some pain in the knee.  Patient states he doesn't recall any 

injury but he sure he probably twisted at some point in time.  Patient denies 

any hip pain patient with any ankle pain patient denies any other problems at 

this time





- Related Data


                                Home Medications











 Medication  Instructions  Recorded  Confirmed


 


Albuterol Inhaler [Ventolin Hfa 1 puff INHALATION HS 08/10/21 06/23/22





Inhaler]   


 


Naproxen Sodium [Aleve] 220 - 440 mg PO Q6H PRN 08/10/21 06/23/22


 


Rosuvastatin Calcium 5 mg PO HS 08/10/21 06/23/22


 


Tiotropium 18 Mcg/Puff [Spiriva] 1 puff INHALATION QAM 08/10/21 06/23/22


 


atenoloL [Tenormin] 50 mg PO QAM 08/10/21 06/23/22











                                    Allergies











Allergy/AdvReac Type Severity Reaction Status Date / Time


 


No Known Allergies Allergy   Verified 07/26/23 11:36














Review of Systems


ROS Statement: 


Those systems with pertinent positive or pertinent negative responses have been 

documented in the HPI.





ROS Other: All systems not noted in ROS Statement are negative.





Past Medical History


Past Medical History: Cancer, COPD, Hyperlipidemia, Hypertension, Osteoarthritis

 (OA), Sleep Apnea/CPAP/BIPAP


Additional Past Medical History / Comment(s): USES CPAP.  PROSTATE CA DIAGNOSED 

IN 4/8/21.  TB AS A CHILD.


History of Any Multi-Drug Resistant Organisms: None Reported


Past Surgical History: Back Surgery, Orthopedic Surgery


Additional Past Surgical History / Comment(s): LEFT KNEE OPEN


Past Anesthesia/Blood Transfusion Reactions: No Reported Reaction


Past Psychological History: No Psychological Hx Reported


Smoking Status: Current every day smoker


Past Alcohol Use History: None Reported


Past Drug Use History: None Reported





- Past Family History


  ** Mother


Family Medical History: Cancer





  ** Father


Family Medical History: Cancer


Additional Family Medical History / Comment(s): PROSTATE





General Exam





- General Exam Comments


Initial Comments: 





GENERAL 


Patient is well-developed and well-nourished.  Patient is in mild distress.





EYES


Patient's pupils are equal and round.  Extraocular motion is intact





SKIN


Unremarkable





NEURO


The patient is alert and oriented 3





PYSCH


Patient has normal interpersonal interactions.





MUSCULOSKELETAL


Patient has supra patella effusion.  Patient has no ligamentous laxity.  Patient

 is able to full range of motion.  There is no redness and there is no area of 

warmth


Limitations: no limitations





Course


                                   Vital Signs











  07/26/23





  11:32


 


Temperature 98.2 F


 


Pulse Rate 60


 


Respiratory 20





Rate 


 


Blood Pressure 125/77


 


O2 Sat by Pulse 99





Oximetry 














Medical Decision Making





- Medical Decision Making





Was pt. sent in by a medical professional or institution (Dr. PA, NP, urgent 

care, hospital, or nursing home...) When possible be specific


@  -No


Did you speak to anyone other than the patient for history (EMS, parent, family,

 police, friend...)? What history was obtained from this source 


@  -No


Did you review nursing and triage notes (agree or disagree)?  Why? 


@  -I reviewed and agree with nursing and triage notes


Were old charts reviewed (outside hosp., previous admission, EMS record, old 

EKG, old radiological studies, urgent care reports/EKG's, nursing home records)?

 Report findings 


@  -No old charts were reviewed


Differential Diagnosis (chest pain, altered mental status, abdominal pain women,

 abdominal pain men, vaginal bleeding, weakness, fever, dyspnea, syncope, 

headache, dizziness, GI bleed, back pain, seizure, CVA, palpatations, mental 

health, musculoskeletal)? 


@  -Differential Musculoskeletal


Muscular strain, contusion, ligament sprain, fracture, arthritis, septic 

arthritis, bursitis, cellulitis, muscle spasm, nerve compression, DVT, arterial 

occlusion, herpes zoster, electrolyte abnormality, tumor.... This is not meant 

to be in all inclusive list


EKG interpreted by me (3pts min.).


@  -As above


X-rays interpreted by me (1pt min.).


@  -X-ray of the knee shows a effusion that is moderate.  There is no fracture 

or acute injury noted


CT interpreted by me (1pt min.).


@  -None done


U/S interpreted by me (1pt. min.).


@  -None done


What testing was considered but not performed or refused? (CT, X-rays, U/S, labs

)? Why?


@  -None


What meds were considered but not given or refused? Why?


@  -None


Did you discuss the management of the patient with other professionals 

(professionals i.e. Dr., PA, NP, lab, RT, psych nurse, , , 

teacher, , )? Give summary


@  -No


Was smoking cessation discussed for >3mins.?


@  -No


Was critical care preformed (if so, how long)?


@  -No


Were there social determinants of health that impacted care today? How? 

(Homelessness, low income, unemployed, alcoholism, drug addiction, 

transportation, low edu. Level, literacy, decrease access to med. care, FDC, 

rehab)?


@  -No


Was there de-escalation of care discussed even if they declined (Discuss DNR or 

withdrawal of care, Hospice)? DNR status


@  -No


What co-morbidities impacted this encounter? (DM, HTN, Smoking, COPD, CAD, 

Cancer, CVA, ARF, Chemo, Hep., AIDS, mental health diagnosis, sleep apnea, 

morbid obesity)?


@  -None


Was patient admitted / discharged? Hospital course, mention meds given and 

route, prescriptions, significant lab abnormalities, going to OR and other 

pertinent info.


@  -And had an x-ray that did not show any acute injury did show an effusion.  

Patient had any immobilizer placed for comfort.  Patient will follow-up with 

orthopedics.


Undiagnosed new problem with uncertain prognosis?


@  -No


Drug Therapy requiring intensive monitoring for toxicity (Heparin, Nitro, 

Insulin, Cardizem)?


@  -No


Were any procedures done?


@  -No


Diagnosis/symptom?


@  -Knee effusion


Acute, or Chronic, or Acute on Chronic?


@  -Acute


Uncomplicated (without systemic symptoms) or Complicated (systemic symptoms)?


@  -Uncomplicated


Side effects of treatment?


@  -No


Exacerbation, Progression, or Severe Exacerbation?


@  -No


Poses a threat to life or bodily function? How? (Chest pain, USA, MI, pneumonia,

 PE, COPD, DKA, ARF, appy, cholecystitis, CVA, Diverticulitis, Homicidal, 

Suicidal, threat to staff... and all critical care pts)


@  -No





Disposition


Clinical Impression: 


 Knee effusion





Disposition: HOME SELF-CARE


Condition: Good


Is patient prescribed a controlled substance at d/c from ED?: No


Referrals: 


Leonidas Aguilar DO [Doctor of Osteopathic Medicine] - 1-2 days


Time of Disposition: 13:47

## 2023-09-14 ENCOUNTER — HOSPITAL ENCOUNTER (OUTPATIENT)
Dept: HOSPITAL 47 - LABWHC1 | Age: 68
Discharge: HOME | End: 2023-09-14
Attending: UROLOGY
Payer: MEDICARE

## 2023-09-14 DIAGNOSIS — C61: Primary | ICD-10-CM

## 2023-09-14 DIAGNOSIS — E78.2: ICD-10-CM

## 2023-09-14 LAB
ALT SERPL-CCNC: 19 U/L (ref 10–49)
AST SERPL-CCNC: 17 U/L (ref 14–35)
CHOLEST SERPL-MCNC: 182 MG/DL (ref 0–200)
HDLC SERPL-MCNC: 56.1 MG/DL (ref 40–60)
LDLC SERPL CALC-MCNC: 111.7 MG/DL (ref 0–131)
PSA SERPL-MCNC: 0.2 NG/ML (ref 0–4.5)
TRIGL SERPL-MCNC: 70.8 MG/DL (ref 0–149)
VLDLC SERPL CALC-MCNC: 14.16 MG/DL (ref 5–40)

## 2023-09-14 PROCEDURE — 84450 TRANSFERASE (AST) (SGOT): CPT

## 2023-09-14 PROCEDURE — 84460 ALANINE AMINO (ALT) (SGPT): CPT

## 2023-09-14 PROCEDURE — 80061 LIPID PANEL: CPT

## 2023-09-14 PROCEDURE — 84153 ASSAY OF PSA TOTAL: CPT

## 2023-09-14 PROCEDURE — 36415 COLL VENOUS BLD VENIPUNCTURE: CPT

## 2024-03-14 ENCOUNTER — HOSPITAL ENCOUNTER (OUTPATIENT)
Dept: HOSPITAL 47 - LABWHC1 | Age: 69
Discharge: HOME | End: 2024-03-14
Attending: UROLOGY
Payer: MEDICARE

## 2024-03-14 DIAGNOSIS — C61: Primary | ICD-10-CM

## 2024-03-14 PROCEDURE — 36415 COLL VENOUS BLD VENIPUNCTURE: CPT

## 2024-03-14 PROCEDURE — 84153 ASSAY OF PSA TOTAL: CPT

## 2024-07-03 ENCOUNTER — HOSPITAL ENCOUNTER (OUTPATIENT)
Dept: HOSPITAL 47 - RADCTMAIN | Age: 69
Discharge: HOME | End: 2024-07-03
Attending: STUDENT IN AN ORGANIZED HEALTH CARE EDUCATION/TRAINING PROGRAM
Payer: MEDICARE

## 2024-07-03 DIAGNOSIS — Z12.2: Primary | ICD-10-CM

## 2024-07-03 DIAGNOSIS — Z87.891: ICD-10-CM

## 2024-07-03 DIAGNOSIS — J43.9: ICD-10-CM

## 2024-07-03 PROCEDURE — 71271 CT THORAX LUNG CANCER SCR C-: CPT

## 2024-07-22 NOTE — CTL
EXAMINATION TYPE:  CT Low Dose Lung

 

DATE OF EXAM ORDERED: 7/3/2024

 

HISTORY: 69-year-old male smoker with a 25+ year pack history. Lung cancer screening. Z12.2,F17.210 N
ICOTINE DEPENDENCE, CIGARETTES, [facility]

 

CT DLP: 138.8 mGycm

CT CTDI: 4.0 mGy

Automated exposure control for dose reduction was used.

 

SCREENING VISIT: Annual follow-up

 

COMPARISON: 6/23/2023

 

TECHNIQUE: Low dose computed tomography scan was performed through the chest at 1 mm thick sections a
nd reconstructed images in multiple planes at 1 mm and 5 mm thick sections.

 

CT DIAGNOSTIC QUALITY: Satisfactory

 

FINDINGS:

The heart is normal size without pericardial effusion. LAD coronary calcifications are present.

 

Borderline ectasia ascending aorta 3.6 cm. Minimal aortic arch calcifications. Bovine configuration t
o the aortic arch.

 

Mildly enlarged caliber main right and left pulmonary arteries up to 2.8 cm suggestive of pulmonary a
rterial hypertension.

 

There is moderate diffuse bronchial wall thickening. Some patchy groundglass change posterior right l
pako baser. Mild emphysematous change. No consolidation or pleural effusion.

 

6 mm fissural pulmonary nodule left upper lung is unchanged, axial image 77.

 

Some strandy retained secretions within the lower trachea.

 

Calcified granuloma posterior right lower lobe, axial image 224.

 

Visualized upper abdomen shows no gross abnormality.

 

Bones: Moderate degenerative disc disease mid to lower thoracic spine. Some bridging anterior endplat
e spondylosis lower thoracic spine.

 

 

IMPRESSION: 

1. Lung-RADS 2, benign. A couple stable pulmonary nodules measuring up to 6 mm.

2. COPD with mild emphysema. The moderate bronchial wall thickening could reflect a prominent compone
nt of chronic bronchitis versus superimposed acute bronchitis. Recommend smoking cessation.

 

CT LUNG RAD AND CT CHEST RECOMMENDATION: Lung-Rad 2 Benign Appearance or Behavior: Continue annual sc
reening with LDCT in 12 months.

 

S Modifier (other clinically significant findings): None

## 2024-09-13 ENCOUNTER — HOSPITAL ENCOUNTER (OUTPATIENT)
Dept: HOSPITAL 47 - LABWHC1 | Age: 69
Discharge: HOME | End: 2024-09-13
Attending: UROLOGY
Payer: MEDICARE

## 2024-09-13 DIAGNOSIS — Z00.6: ICD-10-CM

## 2024-09-13 DIAGNOSIS — F17.210: ICD-10-CM

## 2024-09-13 DIAGNOSIS — Z85.46: ICD-10-CM

## 2024-09-13 DIAGNOSIS — E78.2: ICD-10-CM

## 2024-09-13 DIAGNOSIS — R35.1: ICD-10-CM

## 2024-09-13 DIAGNOSIS — C61: ICD-10-CM

## 2024-09-13 DIAGNOSIS — Z08: Primary | ICD-10-CM

## 2024-09-13 LAB
ALT SERPL-CCNC: 18 U/L (ref 10–49)
AST SERPL-CCNC: 16 U/L (ref 14–35)
CHOLEST SERPL-MCNC: 149 MG/DL (ref 0–200)
HDLC SERPL-MCNC: 50.1 MG/DL (ref 40–60)
LDLC SERPL CALC-MCNC: 86.6 MG/DL (ref 0–131)
PSA SERPL-MCNC: 0.17 NG/ML (ref 0–4.5)
TRIGL SERPL-MCNC: 61.3 MG/DL (ref 0–149)
VLDLC SERPL CALC-MCNC: 12.26 MG/DL (ref 5–40)

## 2024-09-13 PROCEDURE — 84460 ALANINE AMINO (ALT) (SGPT): CPT

## 2024-09-13 PROCEDURE — 80061 LIPID PANEL: CPT

## 2024-09-13 PROCEDURE — 84153 ASSAY OF PSA TOTAL: CPT

## 2024-09-13 PROCEDURE — 36415 COLL VENOUS BLD VENIPUNCTURE: CPT

## 2024-09-13 PROCEDURE — 84450 TRANSFERASE (AST) (SGOT): CPT

## 2024-10-14 ENCOUNTER — HOSPITAL ENCOUNTER (OUTPATIENT)
Dept: HOSPITAL 47 - RADUSWWP | Age: 69
Discharge: HOME | End: 2024-10-14
Attending: STUDENT IN AN ORGANIZED HEALTH CARE EDUCATION/TRAINING PROGRAM
Payer: MEDICARE

## 2024-10-14 DIAGNOSIS — M79.89: Primary | ICD-10-CM

## 2024-10-14 NOTE — US
EXAMINATION TYPE: US venous doppler duplex LE LT

 

DATE OF EXAM: 10/14/2024 4:06 PM

 

COMPARISON: NONE

 

CLINICAL INDICATION: Male, 69 years old with history of M79.89 SOFT TISSUE DISORDER..(LOWER LEFT); Sw
carl. No hx of DVT. Patient takes baby aspirin.

 

TECHNIQUE:  The lower extremity deep venous system is examined utilizing real time linear array sonog
yolanda with graded compression, color doppler sonography, and spectral doppler.

 

SIDE PERFORMED: Left  

 

FINDINGS:

 

VESSELS IMAGED:

Common Femoral Vein

Deep Femoral Vein

Greater Saphenous Vein *

Femoral Vein

Popliteal Vein

Small Saphenous Vein *

Proximal Calf Veins

(* superficial vessels)

 

 

 

Left Leg:  No evidence of DVT

 

Grayscale, color doppler, spectral doppler imaging performed of the deep veins of the lower extremiti
es.  

 

IMPRESSION: 

1. No ultrasound evidence for deep venous thrombosis of either lower extremity.

 

 

 

[facility], Workstation: [id number], 10/14/2024 7:19 PM

## 2025-04-03 ENCOUNTER — HOSPITAL ENCOUNTER (OUTPATIENT)
Dept: HOSPITAL 47 - LABWHC1 | Age: 70
Discharge: HOME | End: 2025-04-03
Attending: UROLOGY
Payer: MEDICARE

## 2025-04-03 DIAGNOSIS — C61: Primary | ICD-10-CM

## 2025-04-03 PROCEDURE — 36415 COLL VENOUS BLD VENIPUNCTURE: CPT

## 2025-04-03 PROCEDURE — 84153 ASSAY OF PSA TOTAL: CPT
